# Patient Record
Sex: MALE
[De-identification: names, ages, dates, MRNs, and addresses within clinical notes are randomized per-mention and may not be internally consistent; named-entity substitution may affect disease eponyms.]

---

## 2017-02-22 ENCOUNTER — TRANSCRIPTION ENCOUNTER (OUTPATIENT)
Age: 37
End: 2017-02-22

## 2018-11-14 ENCOUNTER — APPOINTMENT (OUTPATIENT)
Dept: OTOLARYNGOLOGY | Facility: CLINIC | Age: 38
End: 2018-11-14
Payer: COMMERCIAL

## 2018-11-14 VITALS
WEIGHT: 156.53 LBS | BODY MASS INDEX: 22.41 KG/M2 | SYSTOLIC BLOOD PRESSURE: 123 MMHG | HEART RATE: 69 BPM | HEIGHT: 70 IN | DIASTOLIC BLOOD PRESSURE: 80 MMHG

## 2018-11-14 DIAGNOSIS — Z78.9 OTHER SPECIFIED HEALTH STATUS: ICD-10-CM

## 2018-11-14 DIAGNOSIS — H90.3 SENSORINEURAL HEARING LOSS, BILATERAL: ICD-10-CM

## 2018-11-14 PROCEDURE — 92557 COMPREHENSIVE HEARING TEST: CPT

## 2018-11-14 PROCEDURE — 92567 TYMPANOMETRY: CPT

## 2018-11-14 PROCEDURE — G0268 REMOVAL OF IMPACTED WAX MD: CPT

## 2018-11-14 PROCEDURE — 99204 OFFICE O/P NEW MOD 45 MIN: CPT | Mod: 25

## 2020-08-25 ENCOUNTER — TRANSCRIPTION ENCOUNTER (OUTPATIENT)
Age: 40
End: 2020-08-25

## 2021-09-23 ENCOUNTER — TRANSCRIPTION ENCOUNTER (OUTPATIENT)
Age: 41
End: 2021-09-23

## 2021-11-10 ENCOUNTER — LABORATORY RESULT (OUTPATIENT)
Age: 41
End: 2021-11-10

## 2021-11-10 ENCOUNTER — NON-APPOINTMENT (OUTPATIENT)
Age: 41
End: 2021-11-10

## 2021-11-10 ENCOUNTER — TRANSCRIPTION ENCOUNTER (OUTPATIENT)
Age: 41
End: 2021-11-10

## 2021-11-10 ENCOUNTER — APPOINTMENT (OUTPATIENT)
Dept: CARDIOLOGY | Facility: CLINIC | Age: 41
End: 2021-11-10
Payer: COMMERCIAL

## 2021-11-10 VITALS
HEIGHT: 70 IN | DIASTOLIC BLOOD PRESSURE: 89 MMHG | SYSTOLIC BLOOD PRESSURE: 137 MMHG | OXYGEN SATURATION: 100 % | HEART RATE: 73 BPM | BODY MASS INDEX: 23.77 KG/M2 | WEIGHT: 166 LBS

## 2021-11-10 DIAGNOSIS — Z83.49 FAMILY HISTORY OF OTHER ENDOCRINE, NUTRITIONAL AND METABOLIC DISEASES: ICD-10-CM

## 2021-11-10 DIAGNOSIS — R07.89 OTHER CHEST PAIN: ICD-10-CM

## 2021-11-10 DIAGNOSIS — H61.813 EXOSTOSIS OF EXTERNAL CANAL, BILATERAL: ICD-10-CM

## 2021-11-10 DIAGNOSIS — Z20.828 CONTACT WITH AND (SUSPECTED) EXPOSURE TO OTHER VIRAL COMMUNICABLE DISEASES: ICD-10-CM

## 2021-11-10 DIAGNOSIS — Z82.49 FAMILY HISTORY OF ISCHEMIC HEART DISEASE AND OTHER DISEASES OF THE CIRCULATORY SYSTEM: ICD-10-CM

## 2021-11-10 DIAGNOSIS — Z00.00 ENCOUNTER FOR GENERAL ADULT MEDICAL EXAMINATION W/OUT ABNORMAL FINDINGS: ICD-10-CM

## 2021-11-10 DIAGNOSIS — H61.21 IMPACTED CERUMEN, RIGHT EAR: ICD-10-CM

## 2021-11-10 PROCEDURE — 93000 ELECTROCARDIOGRAM COMPLETE: CPT

## 2021-11-10 PROCEDURE — 99204 OFFICE O/P NEW MOD 45 MIN: CPT

## 2021-11-10 NOTE — DISCUSSION/SUMMARY
[FreeTextEntry1] : The patient was examined.  His blood pressure was 137/89, and his pulse was 73.  His lungs were clear to auscultation.  Cardiac exam was negative for murmurs rubs or gallops.  His EKG showed normal sinus rhythm and was within normal limits.  Blood tests were sent.  We will schedule the patient for treadmill stress test to rule out ischemic heart disease.  We will schedule him for an echocardiogram for LV size and function.  No new medications were prescribed at this visit.  He will return as needed pending the results of the tests.  Total time spent on the day of the encounter was 47 minutes which includes  face-to-face and non face-to-face times personally spent by the physician preparing to see the patient, obtaining  separately obtained history, performing a medically appropriate exam and evaluation, counseling, educating, talking to the family or caregivers, ordering medicines, ordering tests or procedures, referring and communicating with other healthcare  professionals, and documenting clinical information in the electronic health record.

## 2021-11-10 NOTE — REVIEW OF SYSTEMS
[Chest Discomfort] : chest discomfort [Negative] : Heme/Lymph [SOB] : no shortness of breath [Palpitations] : no palpitations [Cough] : no cough

## 2021-11-10 NOTE — REASON FOR VISIT
[FreeTextEntry1] : This is the first office visit for this 41-year-old [place] born male who for the past 2 weeks has been experiencing left-sided chest discomfort.  It comes and goes but it can last for a half an hour to an hour.  There is no associated shortness of breath or palpitation.  The patient's past medical history is largely negative.\par \par The patient never smoked.  He does have 3 alcoholic beverages per week.  He has 2 cups of caffeinated coffee a day.\par \par His mother had hyperthyroidism and had a thyroidectomy.  His father had hypertension.\par \par He takes no medications.\par \par He has no known drug allergies

## 2021-11-11 LAB
ALBUMIN SERPL ELPH-MCNC: 4.8 G/DL
ALP BLD-CCNC: 81 U/L
ALT SERPL-CCNC: 12 U/L
ANION GAP SERPL CALC-SCNC: 14 MMOL/L
AST SERPL-CCNC: 21 U/L
BASOPHILS # BLD AUTO: 0.05 K/UL
BASOPHILS NFR BLD AUTO: 1.3 %
BILIRUB SERPL-MCNC: 0.2 MG/DL
BUN SERPL-MCNC: 13 MG/DL
CALCIUM SERPL-MCNC: 9.7 MG/DL
CHLORIDE SERPL-SCNC: 105 MMOL/L
CHOLEST SERPL-MCNC: 184 MG/DL
CO2 SERPL-SCNC: 23 MMOL/L
COVID-19 SPIKE DOMAIN ANTIBODY INTERPRETATION: POSITIVE
CREAT SERPL-MCNC: 0.82 MG/DL
EOSINOPHIL # BLD AUTO: 0.18 K/UL
EOSINOPHIL NFR BLD AUTO: 4.6 %
ESTIMATED AVERAGE GLUCOSE: 114 MG/DL
GLUCOSE SERPL-MCNC: 89 MG/DL
HBA1C MFR BLD HPLC: 5.6 %
HCT VFR BLD CALC: 42.1 %
HDLC SERPL-MCNC: 72 MG/DL
HGB BLD-MCNC: 13.5 G/DL
IMM GRANULOCYTES NFR BLD AUTO: 0.3 %
LDLC SERPL CALC-MCNC: 102 MG/DL
LYMPHOCYTES # BLD AUTO: 1.53 K/UL
LYMPHOCYTES NFR BLD AUTO: 38.7 %
MAN DIFF?: NORMAL
MCHC RBC-ENTMCNC: 29.5 PG
MCHC RBC-ENTMCNC: 32.1 GM/DL
MCV RBC AUTO: 91.9 FL
MONOCYTES # BLD AUTO: 0.37 K/UL
MONOCYTES NFR BLD AUTO: 9.4 %
NEUTROPHILS # BLD AUTO: 1.81 K/UL
NEUTROPHILS NFR BLD AUTO: 45.7 %
NONHDLC SERPL-MCNC: 112 MG/DL
PLATELET # BLD AUTO: 192 K/UL
POTASSIUM SERPL-SCNC: 4.7 MMOL/L
PROT SERPL-MCNC: 7 G/DL
RBC # BLD: 4.58 M/UL
RBC # FLD: 12.7 %
SARS-COV-2 AB SERPL IA-ACNC: >250 U/ML
SODIUM SERPL-SCNC: 142 MMOL/L
T3RU NFR SERPL: 1 TBI
T4 SERPL-MCNC: 6.1 UG/DL
TRIGL SERPL-MCNC: 48 MG/DL
TSH SERPL-ACNC: 1.49 UIU/ML
WBC # FLD AUTO: 3.95 K/UL

## 2022-02-10 ENCOUNTER — APPOINTMENT (OUTPATIENT)
Dept: CARDIOLOGY | Facility: CLINIC | Age: 42
End: 2022-02-10
Payer: COMMERCIAL

## 2022-02-10 PROCEDURE — 93015 CV STRESS TEST SUPVJ I&R: CPT

## 2022-02-10 PROCEDURE — 93306 TTE W/DOPPLER COMPLETE: CPT
